# Patient Record
Sex: FEMALE | URBAN - METROPOLITAN AREA
[De-identification: names, ages, dates, MRNs, and addresses within clinical notes are randomized per-mention and may not be internally consistent; named-entity substitution may affect disease eponyms.]

---

## 2018-10-11 ENCOUNTER — APPOINTMENT (RX ONLY)
Dept: URBAN - METROPOLITAN AREA CLINIC 124 | Facility: CLINIC | Age: 78
Setting detail: DERMATOLOGY
End: 2018-10-11

## 2018-10-11 DIAGNOSIS — L57.0 ACTINIC KERATOSIS: ICD-10-CM

## 2018-10-11 DIAGNOSIS — L89 PRESSURE ULCER: ICD-10-CM

## 2018-10-11 PROBLEM — E13.9 OTHER SPECIFIED DIABETES MELLITUS WITHOUT COMPLICATIONS: Status: ACTIVE | Noted: 2018-10-11

## 2018-10-11 PROBLEM — Z85.828 PERSONAL HISTORY OF OTHER MALIGNANT NEOPLASM OF SKIN: Status: ACTIVE | Noted: 2018-10-11

## 2018-10-11 PROBLEM — L89.891 PRESSURE ULCER OF OTHER SITE, STAGE 1: Status: ACTIVE | Noted: 2018-10-11

## 2018-10-11 PROBLEM — H91.90 UNSPECIFIED HEARING LOSS, UNSPECIFIED EAR: Status: ACTIVE | Noted: 2018-10-11

## 2018-10-11 PROBLEM — E78.5 HYPERLIPIDEMIA, UNSPECIFIED: Status: ACTIVE | Noted: 2018-10-11

## 2018-10-11 PROBLEM — M12.9 ARTHROPATHY, UNSPECIFIED: Status: ACTIVE | Noted: 2018-10-11

## 2018-10-11 PROBLEM — L70.0 ACNE VULGARIS: Status: ACTIVE | Noted: 2018-10-11

## 2018-10-11 PROBLEM — D48.5 NEOPLASM OF UNCERTAIN BEHAVIOR OF SKIN: Status: ACTIVE | Noted: 2018-10-11

## 2018-10-11 PROBLEM — I10 ESSENTIAL (PRIMARY) HYPERTENSION: Status: ACTIVE | Noted: 2018-10-11

## 2018-10-11 PROCEDURE — 99213 OFFICE O/P EST LOW 20 MIN: CPT | Mod: 25

## 2018-10-11 PROCEDURE — 11101: CPT

## 2018-10-11 PROCEDURE — ? COUNSELING

## 2018-10-11 PROCEDURE — ? LIQUID NITROGEN

## 2018-10-11 PROCEDURE — ? BIOPSY BY SHAVE METHOD

## 2018-10-11 PROCEDURE — 11100: CPT | Mod: 59

## 2018-10-11 PROCEDURE — 17000 DESTRUCT PREMALG LESION: CPT

## 2018-10-11 PROCEDURE — 17003 DESTRUCT PREMALG LES 2-14: CPT

## 2018-10-11 ASSESSMENT — LOCATION DETAILED DESCRIPTION DERM
LOCATION DETAILED: RIGHT LATERAL MALLEOLUS
LOCATION DETAILED: RIGHT DISTAL LATERAL POSTERIOR UPPER ARM
LOCATION DETAILED: RIGHT DISTAL POSTERIOR UPPER ARM
LOCATION DETAILED: LEFT PROXIMAL DORSAL FOREARM
LOCATION DETAILED: RIGHT VENTRAL PROXIMAL FOREARM
LOCATION DETAILED: RIGHT ANTECUBITAL SKIN
LOCATION DETAILED: LEFT DISTAL POSTERIOR UPPER ARM
LOCATION DETAILED: LEFT DISTAL DORSAL FOREARM
LOCATION DETAILED: RIGHT PROXIMAL POSTERIOR UPPER ARM

## 2018-10-11 ASSESSMENT — LOCATION SIMPLE DESCRIPTION DERM
LOCATION SIMPLE: RIGHT FOREARM
LOCATION SIMPLE: RIGHT FOOT
LOCATION SIMPLE: RIGHT POSTERIOR UPPER ARM
LOCATION SIMPLE: RIGHT UPPER ARM
LOCATION SIMPLE: LEFT FOREARM
LOCATION SIMPLE: LEFT POSTERIOR UPPER ARM

## 2018-10-11 ASSESSMENT — LOCATION ZONE DERM
LOCATION ZONE: FEET
LOCATION ZONE: ARM

## 2018-10-11 NOTE — PROCEDURE: BIOPSY BY SHAVE METHOD
Detail Level: Simple
Anesthesia Type: 0.5% bupivacaine
Bill For Surgical Tray: no
Biopsy Method: scissors
Consent: Written consent was obtained and risks were reviewed including but not limited to scarring, infection, bleeding, scabbing, incomplete removal, nerve damage and allergy to anesthesia.
Hemostasis: Drysol
Additional Anesthesia Volume In Cc (Will Not Render If 0): 0
Billing Type: Third-Party Bill
Silver Nitrate Text: The wound bed was treated with silver nitrate after the biopsy was performed.
Anesthesia Volume In Cc (Will Not Render If 0): 0.5
Depth Of Biopsy: dermis
Electrodesiccation Text: The wound bed was treated with electrodesiccation after the biopsy was performed.
Type Of Destruction Used: Curettage
Post-Care Instructions: I reviewed with the patient in detail post-care instructions. Patient is to keep the biopsy site dry overnight, and then apply bacitracin twice daily until healed. Patient may apply hydrogen peroxide soaks to remove any crusting.
Dressing: bandage
Size Of Lesion In Cm: 0.4
Electrodesiccation And Curettage Text: The wound bed was treated with electrodesiccation and curettage after the biopsy was performed.
Curettage Text: The wound bed was treated with curettage after the biopsy was performed.
Biopsy Type: H and E
Wound Care: Petrolatum
Notification Instructions: Patient will be notified of biopsy results. However, patient instructed to call the office if not contacted within 2 weeks.
Cryotherapy Text: The wound bed was treated with cryotherapy after the biopsy was performed.
Was A Bandage Applied: Yes
Detail Level: Detailed
Size Of Lesion In Cm: 0.3

## 2019-01-24 ENCOUNTER — APPOINTMENT (RX ONLY)
Dept: URBAN - METROPOLITAN AREA CLINIC 124 | Facility: CLINIC | Age: 79
Setting detail: DERMATOLOGY
End: 2019-01-24

## 2019-01-24 VITALS — SYSTOLIC BLOOD PRESSURE: 138 MMHG | DIASTOLIC BLOOD PRESSURE: 79 MMHG | HEART RATE: 70 BPM

## 2019-01-24 PROBLEM — C44.319 BASAL CELL CARCINOMA OF SKIN OF OTHER PARTS OF FACE: Status: ACTIVE | Noted: 2019-01-24

## 2019-01-24 PROCEDURE — 13131 CMPLX RPR F/C/C/M/N/AX/G/H/F: CPT | Mod: 59

## 2019-01-24 PROCEDURE — ? MOHS SURGERY

## 2019-01-24 PROCEDURE — 14040 TIS TRNFR F/C/C/M/N/A/G/H/F: CPT

## 2019-01-24 PROCEDURE — 17311 MOHS 1 STAGE H/N/HF/G: CPT

## 2019-01-24 NOTE — PROCEDURE: MOHS SURGERY

## 2021-08-27 ENCOUNTER — APPOINTMENT (RX ONLY)
Dept: URBAN - METROPOLITAN AREA CLINIC 124 | Facility: CLINIC | Age: 81
Setting detail: DERMATOLOGY
End: 2021-08-27

## 2021-08-27 DIAGNOSIS — D485 NEOPLASM OF UNCERTAIN BEHAVIOR OF SKIN: ICD-10-CM

## 2021-08-27 DIAGNOSIS — L72.8 OTHER FOLLICULAR CYSTS OF THE SKIN AND SUBCUTANEOUS TISSUE: ICD-10-CM

## 2021-08-27 PROBLEM — D48.5 NEOPLASM OF UNCERTAIN BEHAVIOR OF SKIN: Status: ACTIVE | Noted: 2021-08-27

## 2021-08-27 PROCEDURE — ? ADDITIONAL NOTES

## 2021-08-27 PROCEDURE — 11102 TANGNTL BX SKIN SINGLE LES: CPT

## 2021-08-27 PROCEDURE — ? BIOPSY BY SHAVE METHOD

## 2021-08-27 PROCEDURE — 11900 INJECT SKIN LESIONS </W 7: CPT

## 2021-08-27 PROCEDURE — ? INTRALESIONAL KENALOG

## 2021-08-27 PROCEDURE — ? COUNSELING

## 2021-08-27 ASSESSMENT — LOCATION ZONE DERM
LOCATION ZONE: TRUNK
LOCATION ZONE: LEG

## 2021-08-27 ASSESSMENT — LOCATION SIMPLE DESCRIPTION DERM
LOCATION SIMPLE: LEFT UPPER BACK
LOCATION SIMPLE: LEFT PRETIBIAL REGION

## 2021-08-27 ASSESSMENT — LOCATION DETAILED DESCRIPTION DERM
LOCATION DETAILED: LEFT SUPERIOR MEDIAL UPPER BACK
LOCATION DETAILED: LEFT PROXIMAL PRETIBIAL REGION

## 2021-08-27 NOTE — PROCEDURE: BIOPSY BY SHAVE METHOD
Detail Level: Detailed
Depth Of Biopsy: dermis
Was A Bandage Applied: Yes
Size Of Lesion In Cm: 2
X Size Of Lesion In Cm: 0
Biopsy Type: H and E
Biopsy Method: Dermablade
Anesthesia Type: 1% lidocaine with epinephrine
Anesthesia Volume In Cc (Will Not Render If 0): 0.5
Hemostasis: Aluminum Chloride
Wound Care: Petrolatum
Dressing: bandage
Destruction After The Procedure: No
Type Of Destruction Used: Curettage
Curettage Text: The wound bed was treated with curettage after the biopsy was performed.
Cryotherapy Text: The wound bed was treated with cryotherapy after the biopsy was performed.
Electrodesiccation Text: The wound bed was treated with electrodesiccation after the biopsy was performed.
Electrodesiccation And Curettage Text: The wound bed was treated with electrodesiccation and curettage after the biopsy was performed.
Silver Nitrate Text: The wound bed was treated with silver nitrate after the biopsy was performed.
Lab: [id number]
Lab Facility: [id number]
Consent: Written consent was obtained and risks were reviewed including but not limited to scarring, infection, bleeding, scabbing, incomplete removal, nerve damage and allergy to anesthesia.
Post-Care Instructions: I reviewed with the patient in detail post-care instructions. Patient is to keep the biopsy site dry overnight, and then apply bacitracin twice daily until healed. Patient may apply hydrogen peroxide soaks to remove any crusting.
Notification Instructions: Patient will be notified of biopsy results. However, patient instructed to call the office if not contacted within 2 weeks.
Billing Type: Third-Party Bill
Information: Selecting Yes will display possible errors in your note based on the variables you have selected. This validation is only offered as a suggestion for you. PLEASE NOTE THAT THE VALIDATION TEXT WILL BE REMOVED WHEN YOU FINALIZE YOUR NOTE. IF YOU WANT TO FAX A PRELIMINARY NOTE YOU WILL NEED TO TOGGLE THIS TO 'NO' IF YOU DO NOT WANT IT IN YOUR FAXED NOTE.

## 2021-10-12 ENCOUNTER — APPOINTMENT (RX ONLY)
Dept: URBAN - METROPOLITAN AREA CLINIC 124 | Facility: CLINIC | Age: 81
Setting detail: DERMATOLOGY
End: 2021-10-12

## 2021-10-12 VITALS — SYSTOLIC BLOOD PRESSURE: 130 MMHG | DIASTOLIC BLOOD PRESSURE: 60 MMHG

## 2021-10-12 PROBLEM — C44.729 SQUAMOUS CELL CARCINOMA OF SKIN OF LEFT LOWER LIMB, INCLUDING HIP: Status: ACTIVE | Noted: 2021-10-12

## 2021-10-12 PROCEDURE — ? REPAIR NOTE

## 2021-10-12 PROCEDURE — ? MOHS SURGERY

## 2021-10-12 PROCEDURE — 17313 MOHS 1 STAGE T/A/L: CPT

## 2021-10-12 PROCEDURE — ? PRESCRIPTION

## 2021-10-12 PROCEDURE — 13120 CMPLX RPR S/A/L 1.1-2.5 CM: CPT

## 2021-10-12 RX ORDER — CEPHALEXIN 500 MG/1
CAPSULE ORAL
Qty: 14 | Refills: 0 | Status: ERX | COMMUNITY
Start: 2021-10-12

## 2021-10-12 RX ORDER — GENTAMICIN SULFATE 1 MG/G
OINTMENT TOPICAL
Qty: 15 | Refills: 0 | Status: ERX | COMMUNITY
Start: 2021-10-12

## 2021-10-12 RX ADMIN — CEPHALEXIN: 500 CAPSULE ORAL at 00:00

## 2021-10-12 RX ADMIN — GENTAMICIN SULFATE: 1 OINTMENT TOPICAL at 00:00

## 2021-10-12 NOTE — PROCEDURE: MOHS SURGERY
Body Location Override (Optional - Billing Will Still Be Based On Selected Body Map Location If Applicable): Left Proximal Pretibial Region

## 2021-11-10 ENCOUNTER — APPOINTMENT (RX ONLY)
Dept: URBAN - METROPOLITAN AREA CLINIC 124 | Facility: CLINIC | Age: 81
Setting detail: DERMATOLOGY
End: 2021-11-10

## 2021-11-10 DIAGNOSIS — Z48.817 ENCOUNTER FOR SURGICAL AFTERCARE FOLLOWING SURGERY ON THE SKIN AND SUBCUTANEOUS TISSUE: ICD-10-CM

## 2021-11-10 PROCEDURE — ? POST-OP WOUND CHECK (NO GLOBAL PERIOD)

## 2021-11-10 PROCEDURE — 99212 OFFICE O/P EST SF 10 MIN: CPT

## 2021-11-10 ASSESSMENT — LOCATION DETAILED DESCRIPTION DERM: LOCATION DETAILED: LEFT PROXIMAL PRETIBIAL REGION

## 2021-11-10 ASSESSMENT — LOCATION SIMPLE DESCRIPTION DERM: LOCATION SIMPLE: LEFT PRETIBIAL REGION

## 2021-11-10 ASSESSMENT — LOCATION ZONE DERM: LOCATION ZONE: LEG

## 2022-01-03 NOTE — PROCEDURE: MOHS SURGERY
Nutrition Care Plan    Nutrition Diagnosis:   Inadequate intake related to decreased appetite with nausea as evidenced by report of diminished diet intake during the week prior to admit     Intervention:  General/healthful diet:   recommend continue reg diet  Vitamin:   recommend continue Vitamin C,D and Zinc  Commercial beverage:  offering BID: high protein lower CHO nutrition supplements: 16 g protein 19 g CHO each    Estimated daily needs: 2300-2600kcal, 133-177g protein    Monitoring and Evaluation:  Amount of food:    Goal is to consume >/=75% of meals/supplements      Keystone Flap Text: The defect edges were debeveled with a #15 scalpel blade.  Given the location of the defect, shape of the defect a keystone flap was deemed most appropriate.  Using a sterile surgical marker, an appropriate keystone flap was drawn incorporating the defect, outlining the appropriate donor tissue and placing the expected incisions within the relaxed skin tension lines where possible. The area thus outlined was incised deep to adipose tissue with a #15 scalpel blade.  The skin margins were undermined to an appropriate distance in all directions around the primary defect and laterally outward around the flap utilizing iris scissors.

## 2022-07-21 NOTE — PROCEDURE: LIQUID NITROGEN
Last visit 7/5/22  No future
Detail Level: Simple
Consent: The patient's consent was obtained including but not limited to risks of crusting, scabbing, blistering, scarring, darker or lighter pigmentary change, recurrence, incomplete removal and infection.
Duration Of Freeze Thaw-Cycle (Seconds): 0
Render Post-Care Instructions In Note?: no
Post-Care Instructions: I reviewed with the patient in detail post-care instructions. Patient is to wear sunprotection, and avoid picking at any of the treated lesions. Pt may apply Vaseline to crusted or scabbing areas.

## 2024-03-14 NOTE — PROCEDURE: MOHS SURGERY
Detail Level: Generalized General Sunscreen Counseling: I recommended a broad spectrum mineral-based sunscreen with a SPF of 50 or higher.  I explained that SPF 50 sunscreens block approximately 97 percent of the sun's harmful rays.  Sunscreens should be applied at least 15 minutes prior to expected sun exposure and then every 2 hours after that as long as sun exposure continues. If swimming or exercising sunscreen should be reapplied every 45 minutes to an hour after getting wet or sweating.  One ounce, or the equivalent of a shot glass full of sunscreen, is adequate to protect the skin not covered by a bathing suit. I also recommended a lip balm with a sunscreen as well. Sun protective clothing can be used in lieu of sunscreen but must be worn the entire time you are exposed to the sun's rays. Recommended my favorites: La Roche Posay mineral, Axis-Y Complete No Stress Physical Sunscreen, [employer] No Show Mineral. Alar Island Pedicle Flap Text: The defect edges were debeveled with a #15 scalpel blade.  Given the location of the defect, shape of the defect and the proximity to the alar rim an island pedicle advancement flap was deemed most appropriate.  Using a sterile surgical marker, an appropriate advancement flap was drawn incorporating the defect, outlining the appropriate donor tissue and placing the expected incisions within the nasal ala running parallel to the alar rim. The area thus outlined was incised with a #15 scalpel blade.  The skin margins were undermined minimally to an appropriate distance in all directions around the primary defect and laterally outward around the island pedicle utilizing iris scissors.  There was minimal undermining beneath the pedicle flap.

## 2024-12-13 NOTE — PROCEDURE: MOHS SURGERY
Patient called and stated she forgot to ask at her appointment but does Dr. Otero think it would be a good idea to get the new RSV Vaccine.     Please call pt back at [phone]   negative Unna Boot Text: An Unna boot was placed to help immobilize the limb and facilitate more rapid healing.